# Patient Record
Sex: FEMALE | Race: BLACK OR AFRICAN AMERICAN | Employment: FULL TIME | URBAN - METROPOLITAN AREA
[De-identification: names, ages, dates, MRNs, and addresses within clinical notes are randomized per-mention and may not be internally consistent; named-entity substitution may affect disease eponyms.]

---

## 2021-08-09 ENCOUNTER — TRANSCRIBE ORDER (OUTPATIENT)
Dept: SCHEDULING | Age: 46
End: 2021-08-09

## 2021-08-09 DIAGNOSIS — Z12.31 VISIT FOR SCREENING MAMMOGRAM: Primary | ICD-10-CM

## 2021-08-13 ENCOUNTER — HOSPITAL ENCOUNTER (OUTPATIENT)
Dept: MAMMOGRAPHY | Age: 46
Discharge: HOME | End: 2021-08-13
Payer: COMMERCIAL

## 2021-08-13 DIAGNOSIS — Z12.31 VISIT FOR SCREENING MAMMOGRAM: ICD-10-CM

## 2021-08-13 DIAGNOSIS — N64.4 MASTODYNIA OF LEFT BREAST: ICD-10-CM

## 2021-08-13 PROCEDURE — 77061 BREAST TOMOSYNTHESIS UNI: CPT

## 2021-08-13 PROCEDURE — 76642 ULTRASOUND BREAST LIMITED: CPT

## 2022-09-13 ENCOUNTER — TELEPHONE (OUTPATIENT)
Dept: FAMILY MEDICINE CLINIC | Age: 47
End: 2022-09-13

## 2022-09-13 NOTE — TELEPHONE ENCOUNTER
----- Message from Nati Mcknight sent at 9/13/2022 11:15 AM EDT -----  Subject: Message to Provider    QUESTIONS  Information for Provider? Lindwood Cabot returned a call she received today but   was not able to connect with front office.  Please give her a call back at   [phone] ok to leave message  ---------------------------------------------------------------------------  --------------  [address] [facility]  [phone]; OK to leave message on voicemail  ---------------------------------------------------------------------------  --------------  SCRIPT ANSWERS  undefined

## 2022-09-14 ENCOUNTER — OFFICE VISIT (OUTPATIENT)
Dept: FAMILY MEDICINE CLINIC | Age: 47
End: 2022-09-14
Payer: COMMERCIAL

## 2022-09-14 VITALS
HEART RATE: 70 BPM | SYSTOLIC BLOOD PRESSURE: 124 MMHG | OXYGEN SATURATION: 98 % | DIASTOLIC BLOOD PRESSURE: 80 MMHG | HEIGHT: 61 IN | RESPIRATION RATE: 16 BRPM | BODY MASS INDEX: 36.25 KG/M2 | WEIGHT: 192 LBS | TEMPERATURE: 97.3 F

## 2022-09-14 DIAGNOSIS — Z00.00 ENCOUNTER FOR WELLNESS EXAMINATION IN ADULT: Primary | ICD-10-CM

## 2022-09-14 DIAGNOSIS — Z13.29 THYROID DISORDER SCREEN: ICD-10-CM

## 2022-09-14 DIAGNOSIS — Z11.59 ENCOUNTER FOR HEPATITIS C SCREENING TEST FOR LOW RISK PATIENT: ICD-10-CM

## 2022-09-14 DIAGNOSIS — Z13.1 DIABETES MELLITUS SCREENING: ICD-10-CM

## 2022-09-14 DIAGNOSIS — D50.9 IRON DEFICIENCY ANEMIA, UNSPECIFIED IRON DEFICIENCY ANEMIA TYPE: ICD-10-CM

## 2022-09-14 DIAGNOSIS — E66.09 CLASS 2 OBESITY DUE TO EXCESS CALORIES WITHOUT SERIOUS COMORBIDITY WITH BODY MASS INDEX (BMI) OF 35.0 TO 35.9 IN ADULT: ICD-10-CM

## 2022-09-14 PROBLEM — E66.812 CLASS 2 OBESITY DUE TO EXCESS CALORIES WITHOUT SERIOUS COMORBIDITY WITH BODY MASS INDEX (BMI) OF 35.0 TO 35.9 IN ADULT: Status: ACTIVE | Noted: 2022-09-14

## 2022-09-14 PROCEDURE — 99396 PREV VISIT EST AGE 40-64: CPT | Performed by: NURSE PRACTITIONER

## 2022-09-14 RX ORDER — PEDIATRIC MULTIVIT 61/D3/VIT K 1500-800
1 CAPSULE ORAL DAILY
COMMUNITY

## 2022-09-14 NOTE — PROGRESS NOTES
1. \"Have you been to the ER, urgent care clinic since your last visit? Hospitalized since your last visit? \" No    2. \"Have you seen or consulted any other health care providers outside of the [address] [place] [address] since your last visit? \" No     3. For patients aged 39-70: Has the patient had a colonoscopy / FIT/ Cologuard? Yes - Care Gap present. Rooming MA/LPN to request most recent results  2 years ago. Good for 10 years    If the patient is female:    4. For patients aged 41-77: Has the patient had a mammogram within the past 2 years? Yes - Care Gap present. Rooming MA/LPN to request most recent results      5. For patients aged 21-65: Has the patient had a pap smear?  Yes - no Care Gap present    Chief Complaint   Patient presents with    New Patient    Establish Care     Moved here a year ago     Visit Vitals  /80 (BP 1 Location: Left upper arm, BP Patient Position: Sitting, BP Cuff Size: Small adult)   Pulse 70   Temp 97.3 °F (36.3 °C) (Temporal)   Resp 16   Ht 5' 1\" (1.549 m)   Wt 192 lb (87.1 kg)   LMP 09/07/2022   SpO2 98%   BMI 36.28 kg/m²

## 2022-09-14 NOTE — PROGRESS NOTES
Subjective  Chief Complaint   Patient presents with    New Patient    Establish Care     Moved here a year ago     HPI:  Aleyda Jean is a 52 y.o. female. 51 yo female presents as a new patient and as such we will be doing her annual wellness with physical and fasting labs. She has recently moved her and is in need of a new PCP. She is declining flu for late in the year and is declining a tdap . She has a colonoscopy at 39 and will need it repeated in 10 years. She will be making an appointment with the [facility] center for a pap. Her history is notable for iron disorders in her family as exhibited by her daughter. Past Medical History:   Diagnosis Date    Fibroid      Family History   Problem Relation Age of Onset    Diabetes Other     Hypertension Other     Cancer Other      Social History     Socioeconomic History    Marital status: SINGLE     Spouse name: Not on file    Number of children: Not on file    Years of education: Not on file    Highest education level: Not on file   Occupational History    Not on file   Tobacco Use    Smoking status: Never    Smokeless tobacco: Never   Vaping Use    Vaping Use: Never used   Substance and Sexual Activity    Alcohol use: Not Currently    Drug use: Never    Sexual activity: Not on file   Other Topics Concern    Not on file   Social History Narrative    Not on file     Social Determinants of Health     Financial Resource Strain: Not on file   Food Insecurity: Not on file   Transportation Needs: Not on file   Physical Activity: Not on file   Stress: Not on file   Social Connections: Not on file   Intimate Partner Violence: Not on file   Housing Stability: Not on file     Current Outpatient Medications on File Prior to Visit   Medication Sig Dispense Refill    pediatric multivit 61-D3-vit K (MVW Complete Formul Multivit) 1,500-800 unit-mcg cap Take 1 Capsule by mouth daily. No current facility-administered medications on file prior to visit.      No Known Allergies  ROS  ROS per HPI      Objective  Physical Exam  Vitals and nursing note reviewed. HENT:      Head: Normocephalic. Cardiovascular:      Rate and Rhythm: Normal rate and regular rhythm. Abdominal:      Palpations: Abdomen is soft. Neurological:      Mental Status: She is alert and oriented to person, place, and time. Psychiatric:         Mood and Affect: Mood normal.         Thought Content: Thought content normal.        Assessment & Plan      ICD-10-CM ICD-9-CM    1. Encounter for wellness examination in adult  Z00.00 V70.0       2. Encounter for hepatitis C screening test for low risk patient  Z11.59 V73.89 HEPATITIS C AB      3. Class 2 obesity due to excess calories without serious comorbidity with body mass index (BMI) of 35.0 to 35.9 in adult  E66.09 278.00 CBC WITH AUTOMATED DIFF    W76.22 S12.65 METABOLIC PANEL, COMPREHENSIVE      LIPID PANEL      4. Diabetes mellitus screening  Z13.1 V77.1 HEMOGLOBIN A1C WITH EAG      5. Thyroid disorder screen  Z13.29 V77.0 TSH 3RD GENERATION      T4, FREE      6. Iron deficiency anemia, unspecified iron deficiency anemia type  D50.9 280.9 FERRITIN      IRON PROFILE        Diagnoses and all orders for this visit:    1. Encounter for wellness examination in adult  We are making sure her health screenings are formed in a timely fashion. They are as documented in the EMR/HPI    2. Encounter for hepatitis C screening test for low risk patient  -     HEPATITIS C AB  Hep C screening and will confirm positive with additional lab testing. We will also refer to gastroenterology for further assessment and treatment. 3. Class 2 obesity due to excess calories without serious comorbidity with body mass index (BMI) of 35.0 to 35.9 in adult  -     CBC WITH AUTOMATED DIFF  -     METABOLIC PANEL, COMPREHENSIVE  -     LIPID PANEL  Obtaining updated CBC ,CMP, and lipid panel for trending and will make treatment decisions when I get the results.     4. Diabetes mellitus screening  -     HEMOGLOBIN A1C WITH EAG  Obtaining updated A1c for trending and will make treatment decisions when I get the results    5. Thyroid disorder screen  -     TSH 3RD GENERATION  -     T4, FREE  Obtaining updated TSH and T4 for trending and will make treatment decisions when I get the results    6. Iron deficiency anemia, unspecified iron deficiency anemia type  -     FERRITIN  -     IRON PROFILE  Obtaining updated ferritin and iron profile and will make treatment/referral decisions when I get the results. .    Other orders  -     IRON PROFILE  -     FERRITIN    Follow-up and Dispositions    Return in about 6 months (around 3/14/2023) for f/u of chronic conditions with fasting labs and physical.       Marisa Whiteside, NP

## 2022-09-15 LAB
ALBUMIN SERPL-MCNC: 4.6 G/DL (ref 3.8–4.8)
ALBUMIN/GLOB SERPL: 1.7 {RATIO} (ref 1.2–2.2)
ALP SERPL-CCNC: 67 IU/L (ref 44–121)
ALT SERPL-CCNC: 16 IU/L (ref 0–32)
AST SERPL-CCNC: 17 IU/L (ref 0–40)
BASOPHILS # BLD AUTO: 0 X10E3/UL (ref 0–0.2)
BASOPHILS NFR BLD AUTO: 1 %
BILIRUB SERPL-MCNC: 0.3 MG/DL (ref 0–1.2)
BUN SERPL-MCNC: 14 MG/DL (ref 6–24)
BUN/CREAT SERPL: 16 (ref 9–23)
CALCIUM SERPL-MCNC: 9.2 MG/DL (ref 8.7–10.2)
CHLORIDE SERPL-SCNC: 99 MMOL/L (ref 96–106)
CHOLEST SERPL-MCNC: 210 MG/DL (ref 100–199)
CO2 SERPL-SCNC: 26 MMOL/L (ref 20–29)
CREAT SERPL-MCNC: 0.88 MG/DL (ref 0.57–1)
EGFR: 82 ML/MIN/1.73
EOSINOPHIL # BLD AUTO: 0.1 X10E3/UL (ref 0–0.4)
EOSINOPHIL NFR BLD AUTO: 2 %
ERYTHROCYTE [DISTWIDTH] IN BLOOD BY AUTOMATED COUNT: 14.6 % (ref 11.7–15.4)
EST. AVERAGE GLUCOSE BLD GHB EST-MCNC: 120 MG/DL
FERRITIN SERPL-MCNC: 53 NG/ML (ref 15–150)
GLOBULIN SER CALC-MCNC: 2.7 G/DL (ref 1.5–4.5)
GLUCOSE SERPL-MCNC: 85 MG/DL (ref 65–99)
HBA1C MFR BLD: 5.8 % (ref 4.8–5.6)
HCT VFR BLD AUTO: 42.4 % (ref 34–46.6)
HCV AB S/CO SERPL IA: <0.1 S/CO RATIO (ref 0–0.9)
HDLC SERPL-MCNC: 59 MG/DL
HGB BLD-MCNC: 13.5 G/DL (ref 11.1–15.9)
IMM GRANULOCYTES # BLD AUTO: 0 X10E3/UL (ref 0–0.1)
IMM GRANULOCYTES NFR BLD AUTO: 0 %
IRON SATN MFR SERPL: 22 % (ref 15–55)
IRON SERPL-MCNC: 66 UG/DL (ref 27–159)
LDLC SERPL CALC-MCNC: 137 MG/DL (ref 0–99)
LYMPHOCYTES # BLD AUTO: 1 X10E3/UL (ref 0.7–3.1)
LYMPHOCYTES NFR BLD AUTO: 21 %
MCH RBC QN AUTO: 28.2 PG (ref 26.6–33)
MCHC RBC AUTO-ENTMCNC: 31.8 G/DL (ref 31.5–35.7)
MCV RBC AUTO: 89 FL (ref 79–97)
MONOCYTES # BLD AUTO: 0.4 X10E3/UL (ref 0.1–0.9)
MONOCYTES NFR BLD AUTO: 8 %
NEUTROPHILS # BLD AUTO: 3.2 X10E3/UL (ref 1.4–7)
NEUTROPHILS NFR BLD AUTO: 68 %
PLATELET # BLD AUTO: 348 X10E3/UL (ref 150–450)
POTASSIUM SERPL-SCNC: 4.7 MMOL/L (ref 3.5–5.2)
PROT SERPL-MCNC: 7.3 G/DL (ref 6–8.5)
RBC # BLD AUTO: 4.79 X10E6/UL (ref 3.77–5.28)
SODIUM SERPL-SCNC: 138 MMOL/L (ref 134–144)
T4 FREE SERPL-MCNC: 1.16 NG/DL (ref 0.82–1.77)
TIBC SERPL-MCNC: 305 UG/DL (ref 250–450)
TRIGL SERPL-MCNC: 81 MG/DL (ref 0–149)
TSH SERPL DL<=0.005 MIU/L-ACNC: 1.42 UIU/ML (ref 0.45–4.5)
UIBC SERPL-MCNC: 239 UG/DL (ref 131–425)
VLDLC SERPL CALC-MCNC: 14 MG/DL (ref 5–40)
WBC # BLD AUTO: 4.7 X10E3/UL (ref 3.4–10.8)

## 2022-09-25 NOTE — PROGRESS NOTES
There are some mild elevations in your total cholesterol and one of your bad cholesterols but not enough to start meds. I would suggest lifestyle changes such as:  Decrease your intake of red meat and increase your intake of fatty fish such as salmon, mackerel and sardines. Increase fiber in your diet. Decrease your intake of full fat dairy products such as milk and yogurt and try to incorporate at least 30 minutes of exercise most days of the week and losing weight helps everything. Your other labs are basically normal.  Some values may be minimally outside the  \"normal\" range but are not harmful or clinically significant. Please contact the office if you have questions or concerns.   We will recheck all your labs at your next office visit

## 2022-10-02 PROBLEM — Z00.00 ENCOUNTER FOR WELLNESS EXAMINATION IN ADULT: Status: ACTIVE | Noted: 2022-09-14

## 2022-11-01 PROBLEM — Z00.00 ENCOUNTER FOR WELLNESS EXAMINATION IN ADULT: Status: RESOLVED | Noted: 2022-09-14 | Resolved: 2022-11-01

## 2023-03-15 ENCOUNTER — OFFICE VISIT (OUTPATIENT)
Dept: FAMILY MEDICINE CLINIC | Age: 48
End: 2023-03-15
Payer: COMMERCIAL

## 2023-03-15 VITALS
SYSTOLIC BLOOD PRESSURE: 128 MMHG | RESPIRATION RATE: 16 BRPM | BODY MASS INDEX: 37 KG/M2 | HEIGHT: 61 IN | OXYGEN SATURATION: 99 % | WEIGHT: 196 LBS | HEART RATE: 104 BPM | DIASTOLIC BLOOD PRESSURE: 92 MMHG

## 2023-03-15 DIAGNOSIS — D50.9 IRON DEFICIENCY ANEMIA, UNSPECIFIED IRON DEFICIENCY ANEMIA TYPE: ICD-10-CM

## 2023-03-15 DIAGNOSIS — Z13.1 DIABETES MELLITUS SCREENING: ICD-10-CM

## 2023-03-15 DIAGNOSIS — M79.10 MYALGIA: ICD-10-CM

## 2023-03-15 DIAGNOSIS — E66.09 CLASS 2 OBESITY DUE TO EXCESS CALORIES WITHOUT SERIOUS COMORBIDITY WITH BODY MASS INDEX (BMI) OF 35.0 TO 35.9 IN ADULT: Primary | ICD-10-CM

## 2023-03-15 DIAGNOSIS — Z13.220 SCREENING CHOLESTEROL LEVEL: ICD-10-CM

## 2023-03-15 DIAGNOSIS — Z13.228 ENCOUNTER FOR SCREENING FOR OTHER METABOLIC DISORDERS: ICD-10-CM

## 2023-03-15 DIAGNOSIS — Z13.29 THYROID DISORDER SCREEN: ICD-10-CM

## 2023-03-15 PROCEDURE — 99214 OFFICE O/P EST MOD 30 MIN: CPT | Performed by: NURSE PRACTITIONER

## 2023-03-15 NOTE — PROGRESS NOTES
Subjective  Chief Complaint   Patient presents with    Follow Up Chronic Condition     6 month follow up muscles ache left side more so than others     HPI:  Claire Patiño is a 52 y.o. female. 49-year-old female presents for follow-up of her chronic conditions which include fibroid tumor. She is adherent with her medications and her health screenings are as documented in the EMR. She gives a history of having had a colonoscopy 2 years and being told to return in 5 years. She also has a current complaint of muscle ache. This has been made for years is worse on the left side than the right. Patient does not take anything for it seems to make it better or worse.     Past Medical History:   Diagnosis Date    Fibroid      Family History   Problem Relation Age of Onset    Diabetes Other     Hypertension Other     Cancer Other      Social History     Socioeconomic History    Marital status: SINGLE     Spouse name: Not on file    Number of children: Not on file    Years of education: Not on file    Highest education level: Not on file   Occupational History    Not on file   Tobacco Use    Smoking status: Never    Smokeless tobacco: Never   Vaping Use    Vaping Use: Never used   Substance and Sexual Activity    Alcohol use: Not Currently    Drug use: Never    Sexual activity: Not on file   Other Topics Concern    Not on file   Social History Narrative    Not on file     Social Determinants of Health     Financial Resource Strain: Low Risk     Difficulty of Paying Living Expenses: Not hard at all   Food Insecurity: No Food Insecurity    Worried About Running Out of Food in the Last Year: Never true    Ran Out of Food in the Last Year: Never true   Transportation Needs: Not on file   Physical Activity: Not on file   Stress: Not on file   Social Connections: Not on file   Intimate Partner Violence: Not on file   Housing Stability: Not on file     Current Outpatient Medications on File Prior to Visit   Medication Sig Dispense Refill    pediatric multivit 61-D3-vit K (MVW Complete Formul Multivit) 1,500-800 unit-mcg cap Take 1 Capsule by mouth daily. No current facility-administered medications on file prior to visit. No Known Allergies  ROS  ROS per HPI and past medical history      Objective  Physical Exam  Vitals and nursing note reviewed. Constitutional:       Appearance: She is obese. HENT:      Head: Normocephalic. Cardiovascular:      Rate and Rhythm: Normal rate and regular rhythm. Heart sounds: Normal heart sounds. Pulmonary:      Effort: Pulmonary effort is normal.      Breath sounds: Normal breath sounds. Abdominal:      General: Bowel sounds are normal.      Palpations: Abdomen is soft. Skin:     General: Skin is warm and dry. Neurological:      Mental Status: She is alert and oriented to person, place, and time. Psychiatric:         Mood and Affect: Mood normal.         Behavior: Behavior normal.         Thought Content: Thought content normal.         Judgment: Judgment normal.        Assessment & Plan      ICD-10-CM ICD-9-CM    1. Class 2 obesity due to excess calories without serious comorbidity with body mass index (BMI) of 35.0 to 35.9 in adult  E66.09 278.00     Z68.35 V85.35       2. Iron deficiency anemia, unspecified iron deficiency anemia type  D50.9 280.9 CBC WITH AUTOMATED DIFF      3. Diabetes mellitus screening  Z13.1 V77.1 HEMOGLOBIN A1C WITH EAG      4. Thyroid disorder screen  Z13.29 V77.0       5. Myalgia  M79.10 729.1 ANTOINE, DIRECT, W/REFLEX      CRP, HIGH SENSITIVITY      SED RATE (ESR)      RHEUMATOID FACTOR, QL      VITAMIN D, 25 HYDROXY      VITAMIN B12      6. Encounter for screening for other metabolic disorders  B39.630 S01.14 METABOLIC PANEL, COMPREHENSIVE      7. Screening cholesterol level  Z13.220 V77.91 LIPID PANEL        Diagnoses and all orders for this visit:    1.  Class 2 obesity due to excess calories without serious comorbidity with body mass index (BMI) of 35.0 to 35.9 in adult  Patient is precontemplative about a weight loss plan. She will let me know when she has more details on what she would like to try. 2. Iron deficiency anemia, unspecified iron deficiency anemia type  -     CBC WITH AUTOMATED DIFF  Pending updated CBC for trending and will make treatment decisions when I get the results. 3. Diabetes mellitus screening  -     HEMOGLOBIN A1C WITH EAG  Obtaining updated A1c for trending and will make treatment decisions when I get the results. 5. Myalgia  -     ANTOINE, DIRECT, W/REFLEX  -     CRP, HIGH SENSITIVITY  -     SED RATE (ESR)  -     RHEUMATOID FACTOR, QL  -     VITAMIN D, 25 HYDROXY  -     VITAMIN B12  Obtaining inflammatory labs vitamin D and vitamin B12 for additional clinical information and will make treatment decisions when I get the results. 6. Encounter for screening for other metabolic disorders  -     METABOLIC PANEL, COMPREHENSIVE  Obtaining updated CMP for trending and will make treatment decisions when I get the results. 7. Screening cholesterol level  -     LIPID PANEL  Obtaining updated lipid panel for trending and will make treatment decisions when I get the results.       Emerson Whitehead, DANNIE

## 2023-03-15 NOTE — PROGRESS NOTES
Chief Complaint   Patient presents with    Follow Up Chronic Condition     6 month follow up muscles ache left side more so than others     3 most recent PHQ Screens 9/14/2022   Little interest or pleasure in doing things Not at all   Feeling down, depressed, irritable, or hopeless Not at all   Total Score PHQ 2 0     1. \"Have you been to the ER, urgent care clinic since your last visit? Hospitalized since your last visit? \" No    2. \"Have you seen or consulted any other health care providers outside of the [address] [place] [address] since your last visit? \" No     3. For patients aged 39-70: Has the patient had a colonoscopy / FIT/ Cologuard? Yes - Care Gap present. Rooming MA/LPN to request most recent results      If the patient is female:    4. For patients aged 41-77: Has the patient had a mammogram within the past 2 years? Yes    5. For patients aged 21-65: Has the patient had a pap smear? Yes - no Care Gap present  Visit Vitals  Resp 16   Ht 5' 1\" (1.549 m)   Wt 196 lb (88.9 kg)   LMP 02/15/2023   BMI 37.03 kg/m²     No flowsheet data found.

## 2023-03-16 ENCOUNTER — TELEPHONE (OUTPATIENT)
Dept: FAMILY MEDICINE CLINIC | Age: 48
End: 2023-03-16

## 2023-03-16 DIAGNOSIS — R79.82 ELEVATED C-REACTIVE PROTEIN (CRP): Primary | ICD-10-CM

## 2023-03-16 NOTE — TELEPHONE ENCOUNTER
Attempted to contact patient about rescheduling her physical for next year due to a scheduling error. Left voicemail asking for a call back to be able to fix.

## 2023-03-22 LAB
25(OH)D3+25(OH)D2 SERPL-MCNC: 33.8 NG/ML (ref 30–100)
ALBUMIN SERPL-MCNC: 4.6 G/DL (ref 3.8–4.8)
ALBUMIN/GLOB SERPL: 1.5 {RATIO} (ref 1.2–2.2)
ALP SERPL-CCNC: 65 IU/L (ref 44–121)
ALT SERPL-CCNC: 19 IU/L (ref 0–32)
ANA SER QL: NEGATIVE
AST SERPL-CCNC: 21 IU/L (ref 0–40)
BASOPHILS # BLD AUTO: 0 X10E3/UL (ref 0–0.2)
BASOPHILS NFR BLD AUTO: 1 %
BILIRUB SERPL-MCNC: 0.3 MG/DL (ref 0–1.2)
BUN SERPL-MCNC: 12 MG/DL (ref 6–24)
BUN/CREAT SERPL: 11 (ref 9–23)
CALCIUM SERPL-MCNC: 9 MG/DL (ref 8.7–10.2)
CHLORIDE SERPL-SCNC: 101 MMOL/L (ref 96–106)
CHOLEST SERPL-MCNC: 231 MG/DL (ref 100–199)
CO2 SERPL-SCNC: 23 MMOL/L (ref 20–29)
CREAT SERPL-MCNC: 1.14 MG/DL (ref 0.57–1)
CRP SERPL HS-MCNC: 16.12 MG/L (ref 0–3)
EGFRCR SERPLBLD CKD-EPI 2021: 60 ML/MIN/1.73
EOSINOPHIL # BLD AUTO: 0 X10E3/UL (ref 0–0.4)
EOSINOPHIL NFR BLD AUTO: 0 %
ERYTHROCYTE [DISTWIDTH] IN BLOOD BY AUTOMATED COUNT: 13.6 % (ref 11.7–15.4)
ERYTHROCYTE [SEDIMENTATION RATE] IN BLOOD BY WESTERGREN METHOD: 6 MM/HR (ref 0–32)
EST. AVERAGE GLUCOSE BLD GHB EST-MCNC: 117 MG/DL
GLOBULIN SER CALC-MCNC: 3 G/DL (ref 1.5–4.5)
GLUCOSE SERPL-MCNC: 100 MG/DL (ref 70–99)
HBA1C MFR BLD: 5.7 % (ref 4.8–5.6)
HCT VFR BLD AUTO: 42.6 % (ref 34–46.6)
HDLC SERPL-MCNC: 64 MG/DL
HGB BLD-MCNC: 14.2 G/DL (ref 11.1–15.9)
IMM GRANULOCYTES # BLD AUTO: 0 X10E3/UL (ref 0–0.1)
IMM GRANULOCYTES NFR BLD AUTO: 0 %
LDLC SERPL CALC-MCNC: 153 MG/DL (ref 0–99)
LYMPHOCYTES # BLD AUTO: 0.7 X10E3/UL (ref 0.7–3.1)
LYMPHOCYTES NFR BLD AUTO: 20 %
MCH RBC QN AUTO: 29.6 PG (ref 26.6–33)
MCHC RBC AUTO-ENTMCNC: 33.3 G/DL (ref 31.5–35.7)
MCV RBC AUTO: 89 FL (ref 79–97)
MONOCYTES # BLD AUTO: 0.6 X10E3/UL (ref 0.1–0.9)
MONOCYTES NFR BLD AUTO: 18 %
NEUTROPHILS # BLD AUTO: 2.2 X10E3/UL (ref 1.4–7)
NEUTROPHILS NFR BLD AUTO: 61 %
PLATELET # BLD AUTO: 301 X10E3/UL (ref 150–450)
POTASSIUM SERPL-SCNC: 4.1 MMOL/L (ref 3.5–5.2)
PROT SERPL-MCNC: 7.6 G/DL (ref 6–8.5)
RBC # BLD AUTO: 4.79 X10E6/UL (ref 3.77–5.28)
RHEUMATOID FACT SERPL-ACNC: 10.3 IU/ML (ref 0–15)
SODIUM SERPL-SCNC: 141 MMOL/L (ref 134–144)
TRIGL SERPL-MCNC: 80 MG/DL (ref 0–149)
VIT B12 SERPL-MCNC: 523 PG/ML (ref 232–1245)
VLDLC SERPL CALC-MCNC: 14 MG/DL (ref 5–40)
WBC # BLD AUTO: 3.5 X10E3/UL (ref 3.4–10.8)

## 2023-03-23 DIAGNOSIS — R79.82 ELEVATED C-REACTIVE PROTEIN (CRP): ICD-10-CM

## 2023-03-23 RX ORDER — SIMVASTATIN 10 MG/1
10 TABLET, FILM COATED ORAL
Qty: 90 TABLET | Refills: 1 | Status: SHIPPED | OUTPATIENT
Start: 2023-03-23

## 2023-03-23 NOTE — TELEPHONE ENCOUNTER
Please let her know that I have sent in a cardiology referral for elevated CRP and I have also ordered a statin

## 2023-08-02 ENCOUNTER — NURSE TRIAGE (OUTPATIENT)
Dept: OTHER | Facility: CLINIC | Age: 48
End: 2023-08-02

## 2023-08-02 ENCOUNTER — OFFICE VISIT (OUTPATIENT)
Facility: CLINIC | Age: 48
End: 2023-08-02
Payer: COMMERCIAL

## 2023-08-02 VITALS
HEIGHT: 61 IN | HEART RATE: 78 BPM | SYSTOLIC BLOOD PRESSURE: 124 MMHG | RESPIRATION RATE: 16 BRPM | TEMPERATURE: 97 F | BODY MASS INDEX: 37 KG/M2 | WEIGHT: 196 LBS | DIASTOLIC BLOOD PRESSURE: 86 MMHG | OXYGEN SATURATION: 97 %

## 2023-08-02 DIAGNOSIS — M54.32 SCIATICA OF LEFT SIDE: Primary | ICD-10-CM

## 2023-08-02 PROCEDURE — 99214 OFFICE O/P EST MOD 30 MIN: CPT | Performed by: NURSE PRACTITIONER

## 2023-08-02 PROCEDURE — 96372 THER/PROPH/DIAG INJ SC/IM: CPT | Performed by: NURSE PRACTITIONER

## 2023-08-02 RX ORDER — IBUPROFEN 800 MG/1
800 TABLET ORAL 3 TIMES DAILY PRN
Qty: 270 TABLET | Refills: 1 | Status: SHIPPED | OUTPATIENT
Start: 2023-08-02 | End: 2023-08-02

## 2023-08-02 RX ORDER — DEXAMETHASONE SODIUM PHOSPHATE 4 MG/ML
4 INJECTION, SOLUTION INTRA-ARTICULAR; INTRALESIONAL; INTRAMUSCULAR; INTRAVENOUS; SOFT TISSUE ONCE
Status: DISCONTINUED | OUTPATIENT
Start: 2023-08-02 | End: 2023-08-02

## 2023-08-02 RX ORDER — PREDNISONE 20 MG/1
20 TABLET ORAL DAILY
Qty: 7 TABLET | Refills: 0 | Status: SHIPPED | OUTPATIENT
Start: 2023-08-02 | End: 2023-08-02

## 2023-08-02 RX ORDER — PREDNISONE 20 MG/1
20 TABLET ORAL DAILY
Qty: 7 TABLET | Refills: 0 | Status: SHIPPED | OUTPATIENT
Start: 2023-08-02 | End: 2023-08-09

## 2023-08-02 RX ORDER — IBUPROFEN 800 MG/1
800 TABLET ORAL 3 TIMES DAILY PRN
Qty: 270 TABLET | Refills: 1 | Status: SHIPPED | OUTPATIENT
Start: 2023-08-02

## 2023-08-02 RX ORDER — DEXAMETHASONE SODIUM PHOSPHATE 4 MG/ML
4 INJECTION, SOLUTION INTRA-ARTICULAR; INTRALESIONAL; INTRAMUSCULAR; INTRAVENOUS; SOFT TISSUE ONCE
Status: COMPLETED | OUTPATIENT
Start: 2023-08-02 | End: 2023-08-02

## 2023-08-02 RX ADMIN — DEXAMETHASONE SODIUM PHOSPHATE 4 MG: 4 INJECTION, SOLUTION INTRA-ARTICULAR; INTRALESIONAL; INTRAMUSCULAR; INTRAVENOUS; SOFT TISSUE at 16:45

## 2023-08-02 SDOH — ECONOMIC STABILITY: HOUSING INSECURITY
IN THE LAST 12 MONTHS, WAS THERE A TIME WHEN YOU DID NOT HAVE A STEADY PLACE TO SLEEP OR SLEPT IN A SHELTER (INCLUDING NOW)?: NO

## 2023-08-02 SDOH — ECONOMIC STABILITY: FOOD INSECURITY: WITHIN THE PAST 12 MONTHS, YOU WORRIED THAT YOUR FOOD WOULD RUN OUT BEFORE YOU GOT MONEY TO BUY MORE.: NEVER TRUE

## 2023-08-02 SDOH — ECONOMIC STABILITY: INCOME INSECURITY: HOW HARD IS IT FOR YOU TO PAY FOR THE VERY BASICS LIKE FOOD, HOUSING, MEDICAL CARE, AND HEATING?: NOT HARD AT ALL

## 2023-08-02 SDOH — ECONOMIC STABILITY: FOOD INSECURITY: WITHIN THE PAST 12 MONTHS, THE FOOD YOU BOUGHT JUST DIDN'T LAST AND YOU DIDN'T HAVE MONEY TO GET MORE.: NEVER TRUE

## 2023-08-02 NOTE — TELEPHONE ENCOUNTER
[facility] [place]    Received call from [id number] [facility] with [employer] Complaint. Subjective: Caller states \"left leg pain and tingling, tingling in right arm\"     Current Symptoms: see above, Right foot sometimes tingles on the bottom, sometimes the big toe hurts and tingles. Left buttocks area has the pain, but it shoots down her leg, leg feels heavy, but denies swelling. Has been seen at [facility] and was told it may be sciatica. Had UFE procedure 1 ear ago. The right arm tingling is mostly when she is sleeping, but today she noticed it while she was on the toilet, not using her arm. Onset: 1.5 months ago; worsening    Associated Symptoms: NA    Pain Severity: 6/10; aching, shooting, tingling; intermittent, waxing and waning    Temperature: denies fever     What has been tried: heat and cold, ibuprofen, stretches    LMP:  last week  Pregnant: NA    Recommended disposition: See in Office Today    Care advice provided, patient verbalizes understanding; denies any other questions or concerns; instructed to call back for any new or worsening symptoms. Patient/Caller agrees with recommended disposition; writer provided warm transfer to [facility] for appointment scheduling    Attention Provider: Thank you for allowing me to participate in the care of your patient. The patient was connected to triage in response to information provided to the ECC/PSC. Please do not respond through this encounter as the response is not directed to a shared pool.       Reason for Disposition   Back pain (with neurologic deficit)    Protocols used: Neurologic Deficit-ADULT-[place]

## 2023-08-02 NOTE — PROGRESS NOTES
Subjective    Chief Complaint   Patient presents with    Pain     Sciatica pain, started in may went to [facility] told her exercise started to let up but left side still pain lower back pain shooting down leg making knee pain having numbness and tingling in extermities       HPI:    Sofy Carty is a 50 y.o. female. 51 yo female presents with a history since May pain that shoots down the back of her left leg starting in her back and radiating down. She does experience some tingling. This first happened to her in May she went to a [facility].  They did an x-ray and did not find any acute processes and offered her prednisone but she turned it down. She presents now with another exacerbation and has tried stretches, hot and cold therapy but nothing has made it substantially better and physical activity or remaining in one position aggravates it    Pain      No current outpatient medications on file prior to visit. No current facility-administered medications on file prior to visit. No Known Allergies  Review of Systems  ROS per HPI and PMH        Objective    Physical Exam  Neurological:      Mental Status: She is alert and oriented to person, place, and time. Psychiatric:         Mood and Affect: Mood normal.         Behavior: Behavior normal.         Thought Content: Thought content normal.         Judgment: Judgment normal.        Assessment & Plan     Diagnosis Orders   1. Sciatica of left side  dexamethasone (DECADRON) injection 4 mg    DISCONTINUED: dexamethasone (DECADRON) injection 4 mg        Morteza was seen today for pain. Diagnoses and all orders for this visit:    Sciatica of left side  -     dexamethasone (DECADRON) injection 4 mg  Will treat with 8 mg IM decadron and a prednisone burst and patient will let me know   If she does not improve    Other orders  -     predniSONE (DELTASONE) 20 MG tablet;  Take 1 tablet by mouth daily for 7 days  -     ibuprofen

## 2023-09-14 ENCOUNTER — OFFICE VISIT (OUTPATIENT)
Facility: CLINIC | Age: 48
End: 2023-09-14
Payer: COMMERCIAL

## 2023-09-14 VITALS
SYSTOLIC BLOOD PRESSURE: 141 MMHG | WEIGHT: 195.4 LBS | HEART RATE: 74 BPM | BODY MASS INDEX: 36.89 KG/M2 | DIASTOLIC BLOOD PRESSURE: 81 MMHG | HEIGHT: 61 IN | OXYGEN SATURATION: 100 % | TEMPERATURE: 97.4 F

## 2023-09-14 DIAGNOSIS — Z00.00 ENCOUNTER FOR WELL ADULT EXAM WITHOUT ABNORMAL FINDINGS: Primary | ICD-10-CM

## 2023-09-14 DIAGNOSIS — Z11.4 ENCOUNTER FOR SCREENING FOR HIV: ICD-10-CM

## 2023-09-14 DIAGNOSIS — Z13.228 ENCOUNTER FOR SCREENING FOR OTHER METABOLIC DISORDERS: ICD-10-CM

## 2023-09-14 DIAGNOSIS — D50.8 IRON DEFICIENCY ANEMIA SECONDARY TO INADEQUATE DIETARY IRON INTAKE: ICD-10-CM

## 2023-09-14 DIAGNOSIS — Z11.4 SCREENING FOR HIV WITHOUT PRESENCE OF RISK FACTORS: ICD-10-CM

## 2023-09-14 DIAGNOSIS — M54.32 SCIATICA OF LEFT SIDE: ICD-10-CM

## 2023-09-14 DIAGNOSIS — R73.09 ELEVATED HEMOGLOBIN A1C: ICD-10-CM

## 2023-09-14 DIAGNOSIS — E78.00 PURE HYPERCHOLESTEROLEMIA: ICD-10-CM

## 2023-09-14 PROCEDURE — G0439 PPPS, SUBSEQ VISIT: HCPCS | Performed by: NURSE PRACTITIONER

## 2023-09-14 SDOH — ECONOMIC STABILITY: INCOME INSECURITY: HOW HARD IS IT FOR YOU TO PAY FOR THE VERY BASICS LIKE FOOD, HOUSING, MEDICAL CARE, AND HEATING?: NOT HARD AT ALL

## 2023-09-14 SDOH — ECONOMIC STABILITY: FOOD INSECURITY: WITHIN THE PAST 12 MONTHS, YOU WORRIED THAT YOUR FOOD WOULD RUN OUT BEFORE YOU GOT MONEY TO BUY MORE.: NEVER TRUE

## 2023-09-14 SDOH — ECONOMIC STABILITY: FOOD INSECURITY: WITHIN THE PAST 12 MONTHS, THE FOOD YOU BOUGHT JUST DIDN'T LAST AND YOU DIDN'T HAVE MONEY TO GET MORE.: NEVER TRUE

## 2023-09-14 ASSESSMENT — PATIENT HEALTH QUESTIONNAIRE - PHQ9
2. FEELING DOWN, DEPRESSED OR HOPELESS: 0
SUM OF ALL RESPONSES TO PHQ9 QUESTIONS 1 & 2: 0
SUM OF ALL RESPONSES TO PHQ QUESTIONS 1-9: 0
1. LITTLE INTEREST OR PLEASURE IN DOING THINGS: 0

## 2023-09-14 NOTE — PROGRESS NOTES
59-year-old female presents for her annual wellness with physical and fasting labs. Her chronic conditions include anemia, obesity, hypercholesterolemia, fibroid tumors and sciatica. She is adherent with her medication regimen. She is declining the flu and tetanus update. She continues to have pain with her sciatica that she treats with ibuprofen and would like to see an Ortho for further evaluation and treatment. Her Pap is due in October of this year. She had a colonoscopy in [facility] in  but cannot remember the name of the practice. She is otherwise without complaint. She also has a current complaint of ongoing issues with sciatica and would like further assessment and treatment  Well Adult Note  Name: Maine Dumont Date: 2023   MRN: [id number] Sex: Female   Age: 50 y.o. Ethnicity: Non- / Non    : 1975 Race: Black /       Yola Angelo is here for well adult exam.  History:    49 yo female presents for her annual wellness with physical and fasting labs. Her chronic conditiohns include anemia. Obesity, sciatica and hypocholesterolemia. She also has a family history of diabetes. She is adherent with her medication regimen. She would like a referral to ortho for further evaluation and treatment    Review of Systems  ROS per HPI    No Known Allergies      Prior to Visit Medications    Medication Sig Taking? Authorizing Provider   Multiple Vitamin (MULTI-VITAMIN DAILY PO) Take by mouth Yes Historical Provider, MD   ibuprofen (ADVIL;MOTRIN) 800 MG tablet Take 1 tablet by mouth 3 times daily as needed for Pain Yes Gaston , APRN - NP         Past Medical History:   Diagnosis Date    Fibroid        History reviewed. No pertinent surgical history.       Family History   Problem Relation Age of Onset    Diabetes Other     Hypertension Other     Cancer Other        Social History     Tobacco Use    Smoking status: Never    Smokeless tobacco: Never

## 2023-09-15 LAB
ALBUMIN SERPL-MCNC: 4.5 G/DL (ref 3.9–4.9)
ALBUMIN/GLOB SERPL: 1.6 {RATIO} (ref 1.2–2.2)
ALP SERPL-CCNC: 64 IU/L (ref 44–121)
ALT SERPL-CCNC: 18 IU/L (ref 0–32)
AST SERPL-CCNC: 18 IU/L (ref 0–40)
BASOPHILS # BLD AUTO: 0 X10E3/UL (ref 0–0.2)
BASOPHILS NFR BLD AUTO: 1 %
BILIRUB SERPL-MCNC: 0.5 MG/DL (ref 0–1.2)
BUN SERPL-MCNC: 18 MG/DL (ref 6–24)
BUN/CREAT SERPL: 17 (ref 9–23)
CALCIUM SERPL-MCNC: 9.2 MG/DL (ref 8.7–10.2)
CHLORIDE SERPL-SCNC: 103 MMOL/L (ref 96–106)
CHOLEST SERPL-MCNC: 238 MG/DL (ref 100–199)
CO2 SERPL-SCNC: 26 MMOL/L (ref 20–29)
CREAT SERPL-MCNC: 1.06 MG/DL (ref 0.57–1)
EGFRCR SERPLBLD CKD-EPI 2021: 65 ML/MIN/1.73
EOSINOPHIL # BLD AUTO: 0.1 X10E3/UL (ref 0–0.4)
EOSINOPHIL NFR BLD AUTO: 2 %
ERYTHROCYTE [DISTWIDTH] IN BLOOD BY AUTOMATED COUNT: 13.8 % (ref 11.7–15.4)
GLOBULIN SER CALC-MCNC: 2.9 G/DL (ref 1.5–4.5)
GLUCOSE SERPL-MCNC: 92 MG/DL (ref 70–99)
HBA1C MFR BLD: 5.9 % (ref 4.8–5.6)
HCT VFR BLD AUTO: 42.9 % (ref 34–46.6)
HDLC SERPL-MCNC: 65 MG/DL
HGB BLD-MCNC: 13.4 G/DL (ref 11.1–15.9)
HIV 1+2 AB+HIV1 P24 AG SERPL QL IA: NON REACTIVE
IMM GRANULOCYTES # BLD AUTO: 0 X10E3/UL (ref 0–0.1)
IMM GRANULOCYTES NFR BLD AUTO: 0 %
LDLC SERPL CALC-MCNC: 164 MG/DL (ref 0–99)
LYMPHOCYTES # BLD AUTO: 1 X10E3/UL (ref 0.7–3.1)
LYMPHOCYTES NFR BLD AUTO: 25 %
MCH RBC QN AUTO: 28.5 PG (ref 26.6–33)
MCHC RBC AUTO-ENTMCNC: 31.2 G/DL (ref 31.5–35.7)
MCV RBC AUTO: 91 FL (ref 79–97)
MONOCYTES # BLD AUTO: 0.3 X10E3/UL (ref 0.1–0.9)
MONOCYTES NFR BLD AUTO: 8 %
NEUTROPHILS # BLD AUTO: 2.6 X10E3/UL (ref 1.4–7)
NEUTROPHILS NFR BLD AUTO: 64 %
PLATELET # BLD AUTO: 383 X10E3/UL (ref 150–450)
POTASSIUM SERPL-SCNC: 4.6 MMOL/L (ref 3.5–5.2)
PROT SERPL-MCNC: 7.4 G/DL (ref 6–8.5)
RBC # BLD AUTO: 4.7 X10E6/UL (ref 3.77–5.28)
SODIUM SERPL-SCNC: 143 MMOL/L (ref 134–144)
TRIGL SERPL-MCNC: 55 MG/DL (ref 0–149)
VLDLC SERPL CALC-MCNC: 9 MG/DL (ref 5–40)
WBC # BLD AUTO: 4 X10E3/UL (ref 3.4–10.8)

## 2023-09-17 PROBLEM — M54.32 SCIATICA OF LEFT SIDE: Status: ACTIVE | Noted: 2023-09-17

## 2023-09-17 PROBLEM — Z13.228 ENCOUNTER FOR SCREENING FOR OTHER METABOLIC DISORDERS: Status: ACTIVE | Noted: 2022-09-14

## 2023-09-17 PROBLEM — Z11.4 SCREENING FOR HIV WITHOUT PRESENCE OF RISK FACTORS: Status: ACTIVE | Noted: 2022-09-14

## 2023-09-17 PROBLEM — Z00.00 ENCOUNTER FOR WELL ADULT EXAM WITHOUT ABNORMAL FINDINGS: Status: ACTIVE | Noted: 2022-09-14

## 2023-09-17 PROBLEM — R73.09 ELEVATED HEMOGLOBIN A1C: Status: ACTIVE | Noted: 2023-09-17

## 2023-09-21 ENCOUNTER — HOSPITAL ENCOUNTER (OUTPATIENT)
Facility: HOSPITAL | Age: 48
Discharge: HOME | End: 2023-09-21
Payer: COMMERCIAL

## 2023-09-21 DIAGNOSIS — Z12.31 VISIT FOR SCREENING MAMMOGRAM: ICD-10-CM

## 2023-09-21 PROCEDURE — 77063 BREAST TOMOSYNTHESIS BI: CPT

## 2023-10-17 PROBLEM — Z11.4 ENCOUNTER FOR SCREENING FOR HIV: Status: RESOLVED | Noted: 2022-09-14 | Resolved: 2023-10-17

## 2024-04-19 ENCOUNTER — TRANSCRIBE ORDERS (OUTPATIENT)
Facility: HOSPITAL | Age: 49
End: 2024-04-19

## 2024-04-19 DIAGNOSIS — Z12.31 VISIT FOR SCREENING MAMMOGRAM: Primary | ICD-10-CM

## 2024-09-18 ENCOUNTER — OFFICE VISIT (OUTPATIENT)
Facility: CLINIC | Age: 49
End: 2024-09-18
Payer: COMMERCIAL

## 2024-09-18 VITALS
TEMPERATURE: 97.1 F | BODY MASS INDEX: 37.64 KG/M2 | HEART RATE: 81 BPM | WEIGHT: 199.38 LBS | RESPIRATION RATE: 16 BRPM | HEIGHT: 61 IN | SYSTOLIC BLOOD PRESSURE: 136 MMHG | OXYGEN SATURATION: 99 % | DIASTOLIC BLOOD PRESSURE: 82 MMHG

## 2024-09-18 DIAGNOSIS — R03.0 ELEVATED BLOOD PRESSURE READING WITHOUT DIAGNOSIS OF HYPERTENSION: ICD-10-CM

## 2024-09-18 DIAGNOSIS — Z28.20 VACCINE REFUSED BY PATIENT: ICD-10-CM

## 2024-09-18 DIAGNOSIS — R73.03 PREDIABETES: ICD-10-CM

## 2024-09-18 DIAGNOSIS — Z12.11 COLON CANCER SCREENING: ICD-10-CM

## 2024-09-18 DIAGNOSIS — E66.09 CLASS 2 OBESITY DUE TO EXCESS CALORIES WITHOUT SERIOUS COMORBIDITY WITH BODY MASS INDEX (BMI) OF 36.0 TO 36.9 IN ADULT: ICD-10-CM

## 2024-09-18 DIAGNOSIS — Z00.00 WELLNESS EXAMINATION: Primary | ICD-10-CM

## 2024-09-18 DIAGNOSIS — E78.00 PURE HYPERCHOLESTEROLEMIA: ICD-10-CM

## 2024-09-18 PROBLEM — D50.9 IRON DEFICIENCY ANEMIA: Status: RESOLVED | Noted: 2022-09-14 | Resolved: 2024-09-18

## 2024-09-18 PROCEDURE — 99396 PREV VISIT EST AGE 40-64: CPT | Performed by: NURSE PRACTITIONER

## 2024-09-18 SDOH — ECONOMIC STABILITY: INCOME INSECURITY: HOW HARD IS IT FOR YOU TO PAY FOR THE VERY BASICS LIKE FOOD, HOUSING, MEDICAL CARE, AND HEATING?: NOT VERY HARD

## 2024-09-18 SDOH — ECONOMIC STABILITY: FOOD INSECURITY: WITHIN THE PAST 12 MONTHS, THE FOOD YOU BOUGHT JUST DIDN'T LAST AND YOU DIDN'T HAVE MONEY TO GET MORE.: NEVER TRUE

## 2024-09-18 SDOH — ECONOMIC STABILITY: FOOD INSECURITY: WITHIN THE PAST 12 MONTHS, YOU WORRIED THAT YOUR FOOD WOULD RUN OUT BEFORE YOU GOT MONEY TO BUY MORE.: NEVER TRUE

## 2024-09-18 ASSESSMENT — PATIENT HEALTH QUESTIONNAIRE - PHQ9
SUM OF ALL RESPONSES TO PHQ QUESTIONS 1-9: 0
SUM OF ALL RESPONSES TO PHQ9 QUESTIONS 1 & 2: 0
SUM OF ALL RESPONSES TO PHQ QUESTIONS 1-9: 0
1. LITTLE INTEREST OR PLEASURE IN DOING THINGS: NOT AT ALL
2. FEELING DOWN, DEPRESSED OR HOPELESS: NOT AT ALL

## 2024-09-18 ASSESSMENT — ENCOUNTER SYMPTOMS
COUGH: 0
WHEEZING: 0
DIARRHEA: 0
TROUBLE SWALLOWING: 0
SHORTNESS OF BREATH: 0
ABDOMINAL PAIN: 0
CONSTIPATION: 0

## 2024-09-19 LAB
25(OH)D3+25(OH)D2 SERPL-MCNC: 30.7 NG/ML (ref 30–100)
ALBUMIN SERPL-MCNC: 4.2 G/DL (ref 3.9–4.9)
ALP SERPL-CCNC: 74 IU/L (ref 44–121)
ALT SERPL-CCNC: 20 IU/L (ref 0–32)
AST SERPL-CCNC: 16 IU/L (ref 0–40)
BASOPHILS # BLD AUTO: 0 X10E3/UL (ref 0–0.2)
BASOPHILS NFR BLD AUTO: 1 %
BILIRUB SERPL-MCNC: 0.3 MG/DL (ref 0–1.2)
BUN SERPL-MCNC: 18 MG/DL (ref 6–24)
BUN/CREAT SERPL: 15 (ref 9–23)
CALCIUM SERPL-MCNC: 9.3 MG/DL (ref 8.7–10.2)
CHLORIDE SERPL-SCNC: 103 MMOL/L (ref 96–106)
CHOLEST SERPL-MCNC: 234 MG/DL (ref 100–199)
CO2 SERPL-SCNC: 25 MMOL/L (ref 20–29)
CREAT SERPL-MCNC: 1.19 MG/DL (ref 0.57–1)
EGFRCR SERPLBLD CKD-EPI 2021: 56 ML/MIN/1.73
EOSINOPHIL # BLD AUTO: 0.2 X10E3/UL (ref 0–0.4)
EOSINOPHIL NFR BLD AUTO: 3 %
ERYTHROCYTE [DISTWIDTH] IN BLOOD BY AUTOMATED COUNT: 13.5 % (ref 11.7–15.4)
GLOBULIN SER CALC-MCNC: 2.9 G/DL (ref 1.5–4.5)
GLUCOSE SERPL-MCNC: 64 MG/DL (ref 70–99)
HBA1C MFR BLD: 6.1 % (ref 4.8–5.6)
HCT VFR BLD AUTO: 41.6 % (ref 34–46.6)
HDLC SERPL-MCNC: 68 MG/DL
HGB BLD-MCNC: 13.3 G/DL (ref 11.1–15.9)
IMM GRANULOCYTES # BLD AUTO: 0 X10E3/UL (ref 0–0.1)
IMM GRANULOCYTES NFR BLD AUTO: 0 %
LDLC SERPL CALC-MCNC: 154 MG/DL (ref 0–99)
LYMPHOCYTES # BLD AUTO: 1.2 X10E3/UL (ref 0.7–3.1)
LYMPHOCYTES NFR BLD AUTO: 22 %
MCH RBC QN AUTO: 29 PG (ref 26.6–33)
MCHC RBC AUTO-ENTMCNC: 32 G/DL (ref 31.5–35.7)
MCV RBC AUTO: 91 FL (ref 79–97)
MONOCYTES # BLD AUTO: 0.5 X10E3/UL (ref 0.1–0.9)
MONOCYTES NFR BLD AUTO: 9 %
NEUTROPHILS # BLD AUTO: 3.6 X10E3/UL (ref 1.4–7)
NEUTROPHILS NFR BLD AUTO: 65 %
PLATELET # BLD AUTO: 367 X10E3/UL (ref 150–450)
POTASSIUM SERPL-SCNC: 4.5 MMOL/L (ref 3.5–5.2)
PROT SERPL-MCNC: 7.1 G/DL (ref 6–8.5)
RBC # BLD AUTO: 4.58 X10E6/UL (ref 3.77–5.28)
SODIUM SERPL-SCNC: 142 MMOL/L (ref 134–144)
TRIGL SERPL-MCNC: 72 MG/DL (ref 0–149)
TSH SERPL DL<=0.005 MIU/L-ACNC: 2.01 UIU/ML (ref 0.45–4.5)
VLDLC SERPL CALC-MCNC: 12 MG/DL (ref 5–40)
WBC # BLD AUTO: 5.5 X10E3/UL (ref 3.4–10.8)

## 2024-11-05 ENCOUNTER — HOSPITAL ENCOUNTER (OUTPATIENT)
Facility: HOSPITAL | Age: 49
Discharge: HOME | End: 2024-11-08
Payer: COMMERCIAL

## 2024-11-05 DIAGNOSIS — Z12.31 VISIT FOR SCREENING MAMMOGRAM: ICD-10-CM

## 2024-11-05 PROCEDURE — 77063 BREAST TOMOSYNTHESIS BI: CPT

## 2025-01-18 ENCOUNTER — OFFICE VISIT (OUTPATIENT)
Age: 50
End: 2025-01-18

## 2025-01-18 VITALS
TEMPERATURE: 97.4 F | BODY MASS INDEX: 38.21 KG/M2 | HEIGHT: 61 IN | OXYGEN SATURATION: 99 % | SYSTOLIC BLOOD PRESSURE: 152 MMHG | WEIGHT: 202.4 LBS | HEART RATE: 96 BPM | RESPIRATION RATE: 16 BRPM | DIASTOLIC BLOOD PRESSURE: 92 MMHG

## 2025-01-18 DIAGNOSIS — R07.9 CHEST PAIN, UNSPECIFIED TYPE: Primary | ICD-10-CM

## 2025-01-18 DIAGNOSIS — I10 HYPERTENSION, UNSPECIFIED TYPE: ICD-10-CM

## 2025-01-18 NOTE — PROGRESS NOTES
Morteza Wiley (:  1975) is a 49 y.o. female,New patient, here for evaluation of the following chief complaint(s):  Chest Pain (Left side along with arm and back feels sore X 1 day )      Assessment & Plan :  Visit Diagnoses and Associated Orders       Chest pain, unspecified type    -  Primary    EKG 12 Lead - Clinic Performed [[facility] [facility]]   - Future Order               Below is the assessment and plan developed based on review of history and  physical exam.    1. Chest pain, unspecified type  Patient appears well, BP elevated,  afebrile, SPO2 99% on room air. No distress noted. EKG indicates possible left atrial enlargement and right superior axis deviation.  Exam did not reveal signs that were significantly abnormal. Patient is nontoxic and well appearing. The patient is appropriate for trial of outpatient management as pain is controlled and they are tolerating PO.  Patient agrees to close follow up and will return if symptoms worsen.  Patient advised if symptoms fail to improve or worsens to follow-up with PCP or ER. Patient verbalized understanding, no questions or concerns at this time.    - EKG 12 Lead - Clinic Performed; Future    2. Hypertension, unspecified type  Morteza Wiley  has been given the following recommendations today due to her/ his  elevated BP reading: rescreen BP within a minimum of 2 weeks and lifestyle modifications to include: dietary sodium restriction and increase physical activity. Patient referred to PCP for further evaluation.      1. Handout given with care instructions.      2. OTC for symptom management. Increase fluid intake.     3. Follow-up with PCP as needed.     4. Go to ED with development of any acute symptoms.         Follow-up    Follow-up with PCP or ER immediately for any new worsening or changes or if symptoms are not improving over the next 5-7 days.            Subjective :  HPI     49 y.o. female presents with symptoms of chest pain x 1 day. Patient

## 2025-01-18 NOTE — PATIENT INSTRUCTIONS
If symptoms worsens or fail to improve follow-up with PCP or ER.    Thank you for visiting [facility] today.     -Take baby aspirin daily  -Drink plenty of fluids    If you begin to have shortness of breath, chest pain or uncontrollable fever of 100.4 or greater, please go to the Emergency Room.

## 2025-01-20 ENCOUNTER — PATIENT MESSAGE (OUTPATIENT)
Facility: CLINIC | Age: 50
End: 2025-01-20

## 2025-01-21 ENCOUNTER — PATIENT MESSAGE (OUTPATIENT)
Facility: CLINIC | Age: 50
End: 2025-01-21

## 2025-01-21 NOTE — TELEPHONE ENCOUNTER
Called the patient to get her scheduled for an appt--[facility]sent a scheduling ticket for the patient to schedule her appt via [employer].

## 2025-01-24 SDOH — ECONOMIC STABILITY: INCOME INSECURITY: IN THE LAST 12 MONTHS, WAS THERE A TIME WHEN YOU WERE NOT ABLE TO PAY THE MORTGAGE OR RENT ON TIME?: NO

## 2025-01-24 SDOH — ECONOMIC STABILITY: FOOD INSECURITY: WITHIN THE PAST 12 MONTHS, THE FOOD YOU BOUGHT JUST DIDN'T LAST AND YOU DIDN'T HAVE MONEY TO GET MORE.: NEVER TRUE

## 2025-01-24 SDOH — ECONOMIC STABILITY: FOOD INSECURITY: WITHIN THE PAST 12 MONTHS, YOU WORRIED THAT YOUR FOOD WOULD RUN OUT BEFORE YOU GOT MONEY TO BUY MORE.: NEVER TRUE

## 2025-01-24 SDOH — ECONOMIC STABILITY: TRANSPORTATION INSECURITY
IN THE PAST 12 MONTHS, HAS LACK OF TRANSPORTATION KEPT YOU FROM MEETINGS, WORK, OR FROM GETTING THINGS NEEDED FOR DAILY LIVING?: NO

## 2025-01-24 SDOH — ECONOMIC STABILITY: TRANSPORTATION INSECURITY
IN THE PAST 12 MONTHS, HAS THE LACK OF TRANSPORTATION KEPT YOU FROM MEDICAL APPOINTMENTS OR FROM GETTING MEDICATIONS?: NO

## 2025-01-27 ENCOUNTER — OFFICE VISIT (OUTPATIENT)
Facility: CLINIC | Age: 50
End: 2025-01-27
Payer: COMMERCIAL

## 2025-01-27 VITALS
HEIGHT: 61 IN | BODY MASS INDEX: 37.76 KG/M2 | HEART RATE: 84 BPM | DIASTOLIC BLOOD PRESSURE: 90 MMHG | SYSTOLIC BLOOD PRESSURE: 142 MMHG | WEIGHT: 200 LBS | RESPIRATION RATE: 16 BRPM | OXYGEN SATURATION: 96 %

## 2025-01-27 DIAGNOSIS — I10 PRIMARY HYPERTENSION: Primary | ICD-10-CM

## 2025-01-27 DIAGNOSIS — R07.89 MUSCULOSKELETAL CHEST PAIN: ICD-10-CM

## 2025-01-27 DIAGNOSIS — Z83.2 FAMILY HISTORY OF CLOTTING DISORDER: ICD-10-CM

## 2025-01-27 PROCEDURE — G8417 CALC BMI ABV UP PARAM F/U: HCPCS

## 2025-01-27 PROCEDURE — 3080F DIAST BP >= 90 MM HG: CPT

## 2025-01-27 PROCEDURE — 99214 OFFICE O/P EST MOD 30 MIN: CPT

## 2025-01-27 PROCEDURE — 1036F TOBACCO NON-USER: CPT

## 2025-01-27 PROCEDURE — 3077F SYST BP >= 140 MM HG: CPT

## 2025-01-27 PROCEDURE — G8427 DOCREV CUR MEDS BY ELIG CLIN: HCPCS

## 2025-01-27 RX ORDER — LOSARTAN POTASSIUM 25 MG/1
25 TABLET ORAL DAILY
Qty: 90 TABLET | Refills: 1 | Status: SHIPPED | OUTPATIENT
Start: 2025-01-27

## 2025-01-27 ASSESSMENT — ENCOUNTER SYMPTOMS
BACK PAIN: 1
ABDOMINAL PAIN: 0
CHEST TIGHTNESS: 0
WHEEZING: 0
SHORTNESS OF BREATH: 0

## 2025-01-27 ASSESSMENT — PATIENT HEALTH QUESTIONNAIRE - PHQ9
SUM OF ALL RESPONSES TO PHQ QUESTIONS 1-9: 0
1. LITTLE INTEREST OR PLEASURE IN DOING THINGS: NOT AT ALL
SUM OF ALL RESPONSES TO PHQ9 QUESTIONS 1 & 2: 0
2. FEELING DOWN, DEPRESSED OR HOPELESS: NOT AT ALL
SUM OF ALL RESPONSES TO PHQ QUESTIONS 1-9: 0

## 2025-01-27 NOTE — PROGRESS NOTES
Chief Complaint   Patient presents with    ED Follow-up     Had pain in chest and back--pain now in back -- years ago had this but now pain is more of after exercise pain. Does not know if its bra straps     PHQ-9 Total Score: 0 (1/27/2025 12:54 PM)    \"Have you been to the ER, urgent care clinic since your last visit?  Hospitalized since your last visit?\"    YES - When: approximately 4 days ago.  Where and Why: [facility].    “Have you seen or consulted any other health care providers outside our system since your last visit?”    NO     “Have you had a pap smear?”    NO    No cervical cancer screening on file       “Have you had a colorectal cancer screening such as a colonoscopy/FIT/Cologuard?    NO    No colonoscopy on file  No cologuard on file  No FIT/FOBT on file   No flexible sigmoidoscopy on file       Click Here for Release of Records Request     Resp 16   Ht 1.549 m (5' 1\")   Wt 90.7 kg (200 lb)   LMP  (LMP Unknown)   BMI 37.79 kg/m²        No data to display

## 2025-01-27 NOTE — ASSESSMENT & PLAN NOTE
-Check BP four times weekly, send message in [employer] in two weeks sharing Bps.   -Discussed benefits, risks, and side effects of prescribed medication

## 2025-01-27 NOTE — ASSESSMENT & PLAN NOTE
-Discussed with patient use of ice and/or heat to affected areas  -Discussed use of OTC medications for pain relief, Tylenol specifically  -Discussed with patient gentle stretching of affected area

## 2025-01-27 NOTE — PROGRESS NOTES
Urgent Care Follow Up    Subjective  Chief Complaint   Patient presents with    ED Follow-up     Had pain in chest and back--pain now in back -- years ago had this but now pain is more of after exercise pain. Does not know if its bra straps     HPI:  Morteza Wiley is a 49 y.o. female. Patient was seen in an urgent care 25 for left sided chest pain along with her arm and back. History of prediabetes, hyperlipidemia, obesity, and family history of clotting disorder and her mom  in her forties of PE, she was a smoker. Patient has not had menses since , history of YOVANI. See below Urgent Care's plan of care summary with follow up in blue.    1. Chest pain, unspecified type  Patient appears well, BP elevated,  afebrile, SPO2 99% on room air. No distress noted. EKG indicates possible left atrial enlargement and right superior axis deviation.  Exam did not reveal signs that were significantly abnormal. Patient is nontoxic and well appearing. The patient is appropriate for trial of outpatient management as pain is controlled and they are tolerating PO.  Patient agrees to close follow up and will return if symptoms worsen.  Patient advised if symptoms fail to improve or worsens to follow-up with PCP or ER. Patient verbalized understanding, no questions or concerns at this time.    Chest discomfort onset 25. She has been taking baby aspirin daily. She reports that the chest pain is gone but the shoulder and back pain has continued. She reports that she has noticed since then her neck has started hurting. Chest pain resolved within the same day that she was seen in Urgent Care. She reports musculoskeletal pain has been constant, states she is having tingling to right hand fingers, pain is to the left neck. Patient reports that there is nothing that makes it better or worse. Pain is now to left neck, shoulder, and arm. Right fingers will intermittently tingle. Denies palpitations or tachycardias. Denies

## 2025-01-28 LAB
ALBUMIN SERPL-MCNC: 4.1 G/DL (ref 3.9–4.9)
ALP SERPL-CCNC: 82 IU/L (ref 44–121)
ALT SERPL-CCNC: 23 IU/L (ref 0–32)
AST SERPL-CCNC: 25 IU/L (ref 0–40)
BASOPHILS # BLD AUTO: 0 X10E3/UL (ref 0–0.2)
BASOPHILS NFR BLD AUTO: 1 %
BILIRUB SERPL-MCNC: 0.4 MG/DL (ref 0–1.2)
BUN SERPL-MCNC: 15 MG/DL (ref 6–24)
BUN/CREAT SERPL: 16 (ref 9–23)
CALCIUM SERPL-MCNC: 9.5 MG/DL (ref 8.7–10.2)
CHLORIDE SERPL-SCNC: 100 MMOL/L (ref 96–106)
CO2 SERPL-SCNC: 24 MMOL/L (ref 20–29)
CREAT SERPL-MCNC: 0.96 MG/DL (ref 0.57–1)
EGFRCR SERPLBLD CKD-EPI 2021: 73 ML/MIN/1.73
EOSINOPHIL # BLD AUTO: 0.1 X10E3/UL (ref 0–0.4)
EOSINOPHIL NFR BLD AUTO: 2 %
ERYTHROCYTE [DISTWIDTH] IN BLOOD BY AUTOMATED COUNT: 13.8 % (ref 11.7–15.4)
GLOBULIN SER CALC-MCNC: 3.6 G/DL (ref 1.5–4.5)
GLUCOSE SERPL-MCNC: 74 MG/DL (ref 70–99)
HCT VFR BLD AUTO: 43.5 % (ref 34–46.6)
HGB BLD-MCNC: 14.2 G/DL (ref 11.1–15.9)
IMM GRANULOCYTES # BLD AUTO: 0 X10E3/UL (ref 0–0.1)
IMM GRANULOCYTES NFR BLD AUTO: 0 %
LYMPHOCYTES # BLD AUTO: 1.4 X10E3/UL (ref 0.7–3.1)
LYMPHOCYTES NFR BLD AUTO: 29 %
MCH RBC QN AUTO: 28.7 PG (ref 26.6–33)
MCHC RBC AUTO-ENTMCNC: 32.6 G/DL (ref 31.5–35.7)
MCV RBC AUTO: 88 FL (ref 79–97)
MONOCYTES # BLD AUTO: 0.4 X10E3/UL (ref 0.1–0.9)
MONOCYTES NFR BLD AUTO: 9 %
NEUTROPHILS # BLD AUTO: 2.9 X10E3/UL (ref 1.4–7)
NEUTROPHILS NFR BLD AUTO: 59 %
PLATELET # BLD AUTO: 357 X10E3/UL (ref 150–450)
POTASSIUM SERPL-SCNC: 4.6 MMOL/L (ref 3.5–5.2)
PROT SERPL-MCNC: 7.7 G/DL (ref 6–8.5)
RBC # BLD AUTO: 4.95 X10E6/UL (ref 3.77–5.28)
SODIUM SERPL-SCNC: 140 MMOL/L (ref 134–144)
TROPONIN T SERPL HS-MCNC: <6 NG/L (ref 0–14)
WBC # BLD AUTO: 4.9 X10E3/UL (ref 3.4–10.8)

## 2025-01-30 ENCOUNTER — TELEPHONE (OUTPATIENT)
Age: 50
End: 2025-01-30

## 2025-01-30 ENCOUNTER — HOSPITAL ENCOUNTER (OUTPATIENT)
Facility: HOSPITAL | Age: 50
Discharge: HOME | End: 2025-01-30
Payer: COMMERCIAL

## 2025-01-30 DIAGNOSIS — R07.89 MUSCULOSKELETAL CHEST PAIN: ICD-10-CM

## 2025-01-30 PROCEDURE — 72040 X-RAY EXAM NECK SPINE 2-3 VW: CPT

## 2025-01-30 NOTE — TELEPHONE ENCOUNTER
Returned pt call to schedule NP appointment no answer LVM.     NP/Family history of clotting disorder/Korin Alvares     N/A

## 2025-01-31 ENCOUNTER — TELEPHONE (OUTPATIENT)
Age: 50
End: 2025-01-31

## 2025-02-04 ENCOUNTER — TELEPHONE (OUTPATIENT)
Age: 50
End: 2025-02-04

## 2025-02-04 NOTE — TELEPHONE ENCOUNTER
Returned pt call to schedule NP appointment no answer LVM.      NP/Family history of clotting disorder/Korin RIOS for 2/28 at 2pm with Kd

## 2025-02-28 ENCOUNTER — OFFICE VISIT (OUTPATIENT)
Age: 50
End: 2025-02-28

## 2025-02-28 VITALS
WEIGHT: 205.8 LBS | HEART RATE: 87 BPM | SYSTOLIC BLOOD PRESSURE: 132 MMHG | BODY MASS INDEX: 38.86 KG/M2 | HEIGHT: 61 IN | DIASTOLIC BLOOD PRESSURE: 84 MMHG | OXYGEN SATURATION: 100 % | TEMPERATURE: 97.3 F

## 2025-02-28 DIAGNOSIS — Z83.2 FAMILY HISTORY OF CLOTTING DISORDER: Primary | ICD-10-CM

## 2025-02-28 ASSESSMENT — PATIENT HEALTH QUESTIONNAIRE - PHQ9
1. LITTLE INTEREST OR PLEASURE IN DOING THINGS: NOT AT ALL
SUM OF ALL RESPONSES TO PHQ QUESTIONS 1-9: 0
2. FEELING DOWN, DEPRESSED OR HOPELESS: NOT AT ALL
SUM OF ALL RESPONSES TO PHQ QUESTIONS 1-9: 0

## 2025-02-28 NOTE — PROGRESS NOTES
Morteza Wiley is a 49 y.o. female   New Patient    Vitals:    02/28/25 1401   BP: 132/84   Site: Left Upper Arm   Pulse: 87   Temp: 97.3 °F (36.3 °C)   SpO2: 100%   Weight: 93.4 kg (205 lb 12.8 oz)   Height: 1.549 m (5' 1\")     No LMP recorded.    \"Have you been to the ER, urgent care clinic since your last visit?  Hospitalized since your last visit?\"    NO    “Have you seen or consulted any other health care providers outside of [facility] since your last visit?”    NO

## 2025-02-28 NOTE — PROGRESS NOTES
[facility] [place] [facility]  [address], [address] [facility] [address]  W: [phone]  F: [phone] Patient ID  Name: Morteza Wiley  YOB: 1975  MRN: [id number]  Referring Provider:   Carmencita Jane APRN - CNP  [id number]  [address]  [place] [address]  Primary Care Provider:   Korin Alvares APRN - NP       HEMATOLOGY/MEDICAL ONCOLOGY  NOTE     Reason for Evaluation:     Chief Complaint   Patient presents with    New Patient     Subjective:     History of Present Illness:   Date of Visit: 2/28/2025  Morteza Wiley is a 49 y.o. female who presents for an initial evaluation for  work-up for a family history of clotting. She has no personal history.   --- Denies any bleeding issues since fibroid embolization      -----  Past Medical History:   Diagnosis Date    Fibroid     Iron deficiency anemia 09/14/2022    Lumbar back pain     Prediabetes       Past Surgical History:   Procedure Laterality Date    UTERINE FIBROID EMBOLIZATION  04/2022      Social History     Tobacco Use    Smoking status: Never     Passive exposure: Past    Smokeless tobacco: Never   Substance Use Topics    Alcohol use: Not Currently      Family History   Problem Relation Age of Onset    Pulmonary Embolism Mother     Hypertension Mother     Unknown Father     Clotting Disorder Sister         possible    No Known Problems Brother     Unknown Maternal Grandmother     Unknown Maternal Grandfather     Unknown Paternal Grandmother     Unknown Paternal Grandfather     Cancer Maternal Aunt      Current Outpatient Medications   Medication Sig    losartan (COZAAR) 25 MG tablet Take 1 tablet by mouth daily    Aspirin-Acetaminophen-Caffeine (EXCEDRIN PO) Take by mouth    Multiple Vitamin (MULTI-VITAMIN DAILY PO) Take by mouth     No current facility-administered medications for this visit.      No Known Allergies   -  Review of Systems Provided by:  Patient  Review of Systems: A

## 2025-03-04 DIAGNOSIS — Z83.2 FAMILY HISTORY OF CLOTTING DISORDER: ICD-10-CM

## 2025-03-05 LAB
AT III PPP CHRO-ACNC: 117 % (ref 75–135)
D DIMER PPP FEU-MCNC: 0.52 MG/L FEU (ref 0–0.65)
PROT C PPP-ACNC: 134 % (ref 73–180)
PROT S ACT/NOR PPP: 83 % (ref 63–140)
PROT S AG ACT/NOR PPP IA: 100 % (ref 60–150)
PROT S FREE AG ACT/NOR PPP IA: 115 % (ref 61–136)

## 2025-03-06 LAB
B2 GLYCOPROT1 IGA SER-ACNC: <9 GPI IGA UNITS (ref 0–25)
B2 GLYCOPROT1 IGG SER-ACNC: <9 GPI IGG UNITS (ref 0–20)
B2 GLYCOPROT1 IGM SER-ACNC: 9 GPI IGM UNITS (ref 0–32)
CARDIOLIPIN IGA SER IA-ACNC: <9 APL U/ML (ref 0–11)
CARDIOLIPIN IGG SER IA-ACNC: <9 GPL U/ML (ref 0–14)
CARDIOLIPIN IGM SER IA-ACNC: 12 MPL U/ML (ref 0–12)
PROT C AG PPP IA-ACNC: 120 % (ref 60–150)

## 2025-03-10 LAB
F5 P.R506Q BLD/T QL: NORMAL
IMP & REVIEW OF LAB RESULTS: NORMAL

## 2025-03-11 LAB
F2 GENE MUT ANL BLD/T: NORMAL
IMP & REVIEW OF LAB RESULTS: NORMAL

## 2025-03-14 LAB
APTT HEX PL PPP: 6 SEC
APTT IMM NP PPP: ABNORMAL SEC
APTT PPP 1:1 SALINE: ABNORMAL SEC
APTT PPP: 28.3 SEC
B2 GLYCOPROT1 IGA SER-ACNC: <10 SAU
B2 GLYCOPROT1 IGG SER-ACNC: <10 SGU
B2 GLYCOPROT1 IGM SER-ACNC: <10 SMU
CARDIOLIPIN IGG SER IA-ACNC: <10 GPL
CARDIOLIPIN IGM SER IA-ACNC: 12 MPL
CONFIRM APTT: 3 SEC
CONFIRM DRVVT: 36.6 SEC
INR PPP: 1 RATIO
LABORATORY COMMENT REPORT: ABNORMAL
PROTHROMBIN TIME: 11 SEC
SCREEN DRVVT/NORMAL: 1.4 RATIO
SCREEN DRVVT: 55.8 SEC
THROMBIN TIME: 17.7 SEC

## 2025-03-24 ENCOUNTER — OFFICE VISIT (OUTPATIENT)
Age: 50
End: 2025-03-24
Payer: COMMERCIAL

## 2025-03-24 VITALS
BODY MASS INDEX: 38.51 KG/M2 | HEIGHT: 61 IN | SYSTOLIC BLOOD PRESSURE: 140 MMHG | OXYGEN SATURATION: 100 % | DIASTOLIC BLOOD PRESSURE: 81 MMHG | WEIGHT: 204 LBS | HEART RATE: 81 BPM

## 2025-03-24 DIAGNOSIS — Z83.2 FAMILY HISTORY OF CLOTTING DISORDER: Primary | ICD-10-CM

## 2025-03-24 PROCEDURE — 1036F TOBACCO NON-USER: CPT | Performed by: INTERNAL MEDICINE

## 2025-03-24 PROCEDURE — 3077F SYST BP >= 140 MM HG: CPT | Performed by: INTERNAL MEDICINE

## 2025-03-24 PROCEDURE — 99213 OFFICE O/P EST LOW 20 MIN: CPT | Performed by: INTERNAL MEDICINE

## 2025-03-24 PROCEDURE — G8417 CALC BMI ABV UP PARAM F/U: HCPCS | Performed by: INTERNAL MEDICINE

## 2025-03-24 PROCEDURE — 3079F DIAST BP 80-89 MM HG: CPT | Performed by: INTERNAL MEDICINE

## 2025-03-24 PROCEDURE — G8427 DOCREV CUR MEDS BY ELIG CLIN: HCPCS | Performed by: INTERNAL MEDICINE

## 2025-03-24 RX ORDER — MULTIVIT-MIN/IRON/FOLIC ACID/K 18-600-40
2000 CAPSULE ORAL DAILY
COMMUNITY

## 2025-03-24 ASSESSMENT — PATIENT HEALTH QUESTIONNAIRE - PHQ9
1. LITTLE INTEREST OR PLEASURE IN DOING THINGS: NOT AT ALL
2. FEELING DOWN, DEPRESSED OR HOPELESS: NOT AT ALL
SUM OF ALL RESPONSES TO PHQ QUESTIONS 1-9: 0

## 2025-03-24 NOTE — PROGRESS NOTES
[facility] [place] [facility]  [address], [address] [facility] [address]  W: [phone]  F: [phone] Patient ID  Name: Morteza Wiley  YOB: 1975  MRN: [id number]  Referring Provider:   No referring provider defined for this encounter.  Primary Care Provider:   Korin Alvares APRN - NP       HEMATOLOGY/MEDICAL ONCOLOGY  NOTE     Reason for Evaluation:     Chief Complaint   Patient presents with    Follow-up     Subjective:     History of Present Illness:   Date of Visit: 3/24/2025  Morteza Wiley is a 49 y.o. female who presents for a follow-up evaluation for family history of clotting. She reports that she was recently told by her brother that he was diagnosed with DVT and PE.       -----  Past Medical History:   Diagnosis Date    Fibroid     Iron deficiency anemia 09/14/2022    Lumbar back pain     Prediabetes       Past Surgical History:   Procedure Laterality Date    UTERINE FIBROID EMBOLIZATION  04/2022      Social History     Tobacco Use    Smoking status: Never     Passive exposure: Past    Smokeless tobacco: Never   Substance Use Topics    Alcohol use: Not Currently      Family History   Problem Relation Age of Onset    Pulmonary Embolism Mother 40    Hypertension Mother     Unknown Father     Clotting Disorder Sister         possible    No Known Problems Brother     Unknown Maternal Grandmother     Unknown Maternal Grandfather     Unknown Paternal Grandmother     Unknown Paternal Grandfather     Cancer Maternal Aunt      Current Outpatient Medications   Medication Sig    vitamin D 50 MCG (2000 UT) CAPS capsule Take 1 capsule by mouth daily    losartan (COZAAR) 25 MG tablet Take 1 tablet by mouth daily    Aspirin-Acetaminophen-Caffeine (EXCEDRIN PO) Take by mouth as needed    Multiple Vitamin (MULTI-VITAMIN DAILY PO) Take by mouth     No current facility-administered medications for this visit.      No Known Allergies   -  Review of Systems

## 2025-03-24 NOTE — PROGRESS NOTES
Morteza Wiley is a 49 y.o. female   Follow-up    Vitals:    03/24/25 0910   BP: (!) 140/81   BP Site: Left Upper Arm   Pulse: 81   SpO2: 100%   Weight: 92.5 kg (204 lb)   Height: 1.549 m (5' 1\")     No LMP recorded.    \"Have you been to the ER, urgent care clinic since your last visit?  Hospitalized since your last visit?\"    NO    “Have you seen or consulted any other health care providers outside of [facility] since your last visit?”    NO

## 2025-08-19 ENCOUNTER — PATIENT MESSAGE (OUTPATIENT)
Facility: CLINIC | Age: 50
End: 2025-08-19